# Patient Record
(demographics unavailable — no encounter records)

---

## 2024-10-16 NOTE — IMAGING
[de-identified] : LEFT HAND skin intact. mild swelling of wrist. good EPL, FPL. good finger extension, flex to loose fist. good finger abduction and adduction.  SILT to median, ulnar, radial distribution.  palpable radial pulse, brisk cap refill all digits. no triggering.   XRAYS OF LEFT WRIST (3 views - PA, OBLIQUE, AND LATERAL VIEWS): stable position/alignment of distal radius fx and ulnar styloid fx. open physes.

## 2024-10-16 NOTE — HISTORY OF PRESENT ILLNESS
[de-identified] : 10/16/24: 2 weeks s/p LEFT distal radius SH I / III fx and ulnar styloid fx from 10/1/24. in short arm cast, cast feels loose.  10/2/24: HPI obtained from patient's parent as independent historian due to patient's age making them an unreliable historian.  13yo female (LHD) present with father for LEFT wrist pain after doing a backflip on 10/1/24. Went to [facility] on [facility] => XR, splint, sling. [FreeTextEntry5] : ANITHA is here today to follow up on her LEFT wrist. pt states since last visit, pain decreased.

## 2024-10-16 NOTE — ASSESSMENT
[FreeTextEntry1] : - I personally applied a NEW fiberglass short arm cast, anticipate 6 weeks total. reviewed cast care instructions. Reviewed signs/symptoms of compartment syndrome that would warrant emergent evaluation, including severe swelling, worsening pain, persistent numbness/tingling that does not resolve with elevation, pale/white/cold fingers. Reviewed cast care instructions - do not remove cast, do not get cast wet, do not put objects down cast. We discussed that there is a moderate risk of complications and morbidity with casting, including skin burn, skin irritation, skin breakdown, and stiffness from immobilization. They expressed understanding.  - continue HEP (making a fist) 3-5x/day to reduce finger stiffness. - elevate hand above level of heart as much as possible to reduce swelling. - NWB to L hand. no gym/sports. accommodations for writing.   F/u 2wks. X-rays L wrist in cast.

## 2024-10-30 NOTE — ASSESSMENT
[FreeTextEntry1] : - maintain fiberglass short arm cast. - elevate hand above level of heart as much as possible to reduce swelling. - NWB to L hand. no gym/sports. accommodations for writing.   F/u 2wks for cast removal. X-rays of L wrist out of cast.

## 2024-10-30 NOTE — IMAGING
[de-identified] : LEFT HAND SAC intact. good EPL, FPL. good finger extension, flex to loose fist. good finger abduction and adduction.  SILT to median, ulnar, radial distribution.  brisk cap refill all digits. no triggering.   XRAYS OF LEFT WRIST (3 views - PA, OBLIQUE, AND LATERAL VIEWS): in cast, obscuring details. stable position/alignment with interval healing of distal radius fx and ulnar styloid fx. open physes.

## 2024-10-30 NOTE — HISTORY OF PRESENT ILLNESS
[de-identified] : 10/30/24: 4 weeks s/p LEFT distal radius SH I / III fx and ulnar styloid fx from 10/1/24. in short arm cast. doing well, no cast issues.  10/16/24: 2 weeks s/p LEFT distal radius SH I / III fx and ulnar styloid fx from 10/1/24. in short arm cast, cast feels loose.  10/2/24: HPI obtained from patient's parent as independent historian due to patient's age making them an unreliable historian.  11yo female (LHD) present with father for LEFT wrist pain after doing a backflip on 10/1/24. Went to [facility] on [facility] => XR, splint, sling. [FreeTextEntry5] : ANITHA is here today to follow up on her LEFT wrist. pt states since last visit, pain decreased.

## 2024-11-13 NOTE — HISTORY OF PRESENT ILLNESS
[de-identified] : 11/13/24: 6 weeks s/p LEFT distal radius SH I / III fx and ulnar styloid fx from 10/1/24. in short arm cast, denies pain.  10/30/24: 4 weeks s/p LEFT distal radius SH I / III fx and ulnar styloid fx from 10/1/24. in short arm cast. doing well, no cast issues.  10/16/24: 2 weeks s/p LEFT distal radius SH I / III fx and ulnar styloid fx from 10/1/24. in short arm cast, cast feels loose.  10/2/24: HPI obtained from patient's parent as independent historian due to patient's age making them an unreliable historian.  11yo female (LHD) present with father for LEFT wrist pain after doing a backflip on 10/1/24. Went to [facility] on [facility] => XR, splint, sling. [FreeTextEntry5] : ANITHA is here today to follow up on her LEFT wrist and cast removal.

## 2024-11-13 NOTE — IMAGING
[de-identified] : LEFT HAND skin intact. non-TTP to distal radius. wrist ROM: limited extension, flexion. good EPL, FPL. good finger extension, flex to full fist. good finger abduction and adduction.  SILT to median, ulnar, radial distribution.  brisk cap refill all digits. no triggering.   XRAYS OF LEFT WRIST (3 views - PA, OBLIQUE, AND LATERAL VIEWS): stable position/alignment with interval healing of distal radius fx and ulnar styloid fx. open physes.

## 2024-11-13 NOTE — ASSESSMENT
[FreeTextEntry1] : - removed fiberglass short arm cast. - advance activity as pain allows. no gym/sports until 11/27/24. no gymnastics.  F/u 5 weeks. X-rays of L wrist to monitor fracture healing and physis.

## 2025-01-08 NOTE — ASSESSMENT
[FreeTextEntry1] : - activity as tolerated. she has already returned to gym/sports. ok for gymnastics.  F/u 3 months. X-rays of L wrist to monitor fracture healing and physis.

## 2025-01-08 NOTE — IMAGING
[de-identified] : LEFT HAND skin intact. no swelling. no TTP. wrist ROM: good extension, flexion. good pronation, supination. good EPL, FPL. good finger extension, flex to full fist. good finger abduction and adduction.  SILT to median, ulnar, radial distribution.  brisk cap refill all digits. no triggering.   XRAYS OF LEFT WRIST (3 views - PA, OBLIQUE, AND LATERAL VIEWS): stable position/alignment with interval healing of distal radius fx and ulnar styloid fx. open physes.

## 2025-01-08 NOTE — HISTORY OF PRESENT ILLNESS
[de-identified] : 1/8/25: 3 months s/p LEFT distal radius SH I / III fx and ulnar styloid fx from 10/1/24. doing well, denies pain with activities and gym/sports. returned to gymnastics - tumbles with tiger paw braces.  11/13/24: 6 weeks s/p LEFT distal radius SH I / III fx and ulnar styloid fx from 10/1/24. in short arm cast, denies pain.  10/30/24: 4 weeks s/p LEFT distal radius SH I / III fx and ulnar styloid fx from 10/1/24. in short arm cast. doing well, no cast issues.  10/16/24: 2 weeks s/p LEFT distal radius SH I / III fx and ulnar styloid fx from 10/1/24. in short arm cast, cast feels loose.  10/2/24: HPI obtained from patient's parent as independent historian due to patient's age making them an unreliable historian.  13yo female (LHD) present with father for LEFT wrist pain after doing a backflip on 10/1/24. Went to [facility] on [facility] => XR, splint, sling. [FreeTextEntry5] : ANITHA is here today to follow up on her LEFT wrist. pt denies pain today.

## 2025-04-09 NOTE — ASSESSMENT
[FreeTextEntry1] : - activity as tolerated.   F/u 3 months. X-rays of L wrist to monitor fracture healing and physis.

## 2025-04-09 NOTE — IMAGING
[de-identified] : LEFT HAND skin intact. no swelling. no TTP. wrist ROM: good extension, flexion. good pronation, supination. good EPL, FPL. good finger extension, flex to full fist. good finger abduction and adduction.  SILT to median, ulnar, radial distribution.  brisk cap refill all digits. no triggering.  @4/9/25 XRAYS OF LEFT WRIST (3 views - PA, OBLIQUE, AND LATERAL VIEWS): stable position/alignment with interval healing of distal radius fx physeal fx. stable position/alignment of ulnar styloid fx. open physes.

## 2025-04-09 NOTE — HISTORY OF PRESENT ILLNESS
[de-identified] : 4/9/25: 6 months s/p LEFT distal radius SH I / III fx and ulnar styloid fx from 10/1/24. doing well, denies pain with activities including gymnastics and volleyball.  1/8/25: 3 months s/p LEFT distal radius SH I / III fx and ulnar styloid fx from 10/1/24. doing well, denies pain with activities and gym/sports. returned to gymnastics - tumbles with tiger paw braces.  11/13/24: 6 weeks s/p LEFT distal radius SH I / III fx and ulnar styloid fx from 10/1/24. in short arm cast, denies pain.  10/30/24: 4 weeks s/p LEFT distal radius SH I / III fx and ulnar styloid fx from 10/1/24. in short arm cast. doing well, no cast issues.  10/16/24: 2 weeks s/p LEFT distal radius SH I / III fx and ulnar styloid fx from 10/1/24. in short arm cast, cast feels loose.  10/2/24: HPI obtained from patient's parent as independent historian due to patient's age making them an unreliable historian.  11yo female (LHD) present with father for LEFT wrist pain after doing a backflip on 10/1/24. Went to [facility] on [facility] => XR, splint, sling. [FreeTextEntry5] : ANITHA is here today to follow up on her LEFT wrist. pt denies pain today.

## 2025-07-09 NOTE — HISTORY OF PRESENT ILLNESS
[de-identified] : 7/9/25: 9 months s/p LEFT distal radius SH I / III fx and ulnar styloid fx from 10/1/24. denies pain with activity.  4/9/25: 6 months s/p LEFT distal radius SH I / III fx and ulnar styloid fx from 10/1/24. doing well, denies pain with activities including gymnastics and volleyball.  1/8/25: 3 months s/p LEFT distal radius SH I / III fx and ulnar styloid fx from 10/1/24. doing well, denies pain with activities and gym/sports. returned to gymnastics - tumbles with tiger paw braces.  11/13/24: 6 weeks s/p LEFT distal radius SH I / III fx and ulnar styloid fx from 10/1/24. in short arm cast, denies pain.  10/30/24: 4 weeks s/p LEFT distal radius SH I / III fx and ulnar styloid fx from 10/1/24. in short arm cast. doing well, no cast issues.  10/16/24: 2 weeks s/p LEFT distal radius SH I / III fx and ulnar styloid fx from 10/1/24. in short arm cast, cast feels loose.  10/2/24: HPI obtained from patient's parent as independent historian due to patient's age making them an unreliable historian.  13yo female (LHD) present with father for LEFT wrist pain after doing a backflip on 10/1/24. Went to [facility] on [facility] => XR, splint, sling. [FreeTextEntry5] : ANITHA is here today to follow up on her LEFT wrist. pt denies pain today.

## 2025-07-09 NOTE — IMAGING
[de-identified] : LEFT HAND skin intact. no swelling. no TTP. wrist ROM: good extension, flexion. good pronation, supination. good EPL, FPL. good finger extension, flex to full fist. good finger abduction and adduction.  SILT to median, ulnar, radial distribution.  brisk cap refill all digits. no triggering.  DRUJ shear => no pain. no instability.  @7/9/25 XRAYS OF LEFT WRIST (3 views - PA, OBLIQUE, AND LATERAL VIEWS): healed distal radius physeal fx. stable position/alignment of ulnar styloid fx. open physes.